# Patient Record
Sex: MALE | Race: BLACK OR AFRICAN AMERICAN
[De-identification: names, ages, dates, MRNs, and addresses within clinical notes are randomized per-mention and may not be internally consistent; named-entity substitution may affect disease eponyms.]

---

## 2019-01-03 ENCOUNTER — HOSPITAL ENCOUNTER (OUTPATIENT)
Dept: HOSPITAL 89 - OR | Age: 54
Discharge: HOME | End: 2019-01-03
Attending: FAMILY MEDICINE
Payer: COMMERCIAL

## 2019-01-03 VITALS — SYSTOLIC BLOOD PRESSURE: 93 MMHG | DIASTOLIC BLOOD PRESSURE: 60 MMHG

## 2019-01-03 VITALS — DIASTOLIC BLOOD PRESSURE: 62 MMHG | SYSTOLIC BLOOD PRESSURE: 92 MMHG

## 2019-01-03 VITALS — SYSTOLIC BLOOD PRESSURE: 94 MMHG | DIASTOLIC BLOOD PRESSURE: 60 MMHG

## 2019-01-03 VITALS — DIASTOLIC BLOOD PRESSURE: 72 MMHG | SYSTOLIC BLOOD PRESSURE: 116 MMHG

## 2019-01-03 VITALS — DIASTOLIC BLOOD PRESSURE: 74 MMHG | SYSTOLIC BLOOD PRESSURE: 114 MMHG

## 2019-01-03 VITALS — WEIGHT: 128 LBS | BODY MASS INDEX: 20.09 KG/M2 | HEIGHT: 67 IN

## 2019-01-03 VITALS — DIASTOLIC BLOOD PRESSURE: 58 MMHG | SYSTOLIC BLOOD PRESSURE: 98 MMHG

## 2019-01-03 VITALS — SYSTOLIC BLOOD PRESSURE: 120 MMHG | DIASTOLIC BLOOD PRESSURE: 74 MMHG

## 2019-01-03 VITALS — DIASTOLIC BLOOD PRESSURE: 77 MMHG | SYSTOLIC BLOOD PRESSURE: 118 MMHG

## 2019-01-03 DIAGNOSIS — Z12.11: Primary | ICD-10-CM

## 2019-01-03 NOTE — NUR
0816 PT ARRIVED IN SD IN LEFT LATERAL POSITION, SBAR FROM MARISOL PATEL RN AND DR. HERNANDEZ, VSS

0820 DOWN TO 3L MASK

0830 1L MASK, PT STILL RESTING L LATERAL POSITION

0900 PT AWAKE, TOLERATING OJ, SALINE LOCKED IV, VSS

0934 STARTED ORTHOSTATICS, STABLE, CALLED [facility] TO COME 

0940 COVERED D/C INSTRUCTIONS WITH PT, WAITING ON [facility]

1000 S. ARIEL HANLEY COVERED INSTRUCTIONS WITH WIFE, ADOLPH, AND WALKED OUT TO VEHICLE AROUND 
1010